# Patient Record
Sex: FEMALE | Race: ASIAN | NOT HISPANIC OR LATINO | Employment: OTHER | URBAN - METROPOLITAN AREA
[De-identification: names, ages, dates, MRNs, and addresses within clinical notes are randomized per-mention and may not be internally consistent; named-entity substitution may affect disease eponyms.]

---

## 2025-05-11 ENCOUNTER — HOSPITAL ENCOUNTER (EMERGENCY)
Facility: HOSPITAL | Age: 70
Discharge: HOME | End: 2025-05-11
Attending: STUDENT IN AN ORGANIZED HEALTH CARE EDUCATION/TRAINING PROGRAM
Payer: MEDICARE

## 2025-05-11 ENCOUNTER — APPOINTMENT (OUTPATIENT)
Dept: CARDIOLOGY | Facility: HOSPITAL | Age: 70
End: 2025-05-11
Payer: MEDICARE

## 2025-05-11 ENCOUNTER — APPOINTMENT (OUTPATIENT)
Dept: RADIOLOGY | Facility: HOSPITAL | Age: 70
End: 2025-05-11
Payer: MEDICARE

## 2025-05-11 VITALS
HEIGHT: 61 IN | WEIGHT: 125 LBS | TEMPERATURE: 98.6 F | HEART RATE: 66 BPM | BODY MASS INDEX: 23.6 KG/M2 | OXYGEN SATURATION: 99 % | RESPIRATION RATE: 18 BRPM | DIASTOLIC BLOOD PRESSURE: 79 MMHG | SYSTOLIC BLOOD PRESSURE: 146 MMHG

## 2025-05-11 DIAGNOSIS — R07.9 CHEST PAIN, UNSPECIFIED TYPE: Primary | ICD-10-CM

## 2025-05-11 LAB
ALBUMIN SERPL BCP-MCNC: 4.4 G/DL (ref 3.4–5)
ALP SERPL-CCNC: 49 U/L (ref 33–136)
ALT SERPL W P-5'-P-CCNC: 38 U/L (ref 7–45)
ANION GAP SERPL CALC-SCNC: 11 MMOL/L (ref 10–20)
AST SERPL W P-5'-P-CCNC: 27 U/L (ref 9–39)
BASOPHILS # BLD AUTO: 0.06 X10*3/UL (ref 0–0.1)
BASOPHILS NFR BLD AUTO: 0.8 %
BILIRUB SERPL-MCNC: 0.4 MG/DL (ref 0–1.2)
BNP SERPL-MCNC: 12 PG/ML (ref 0–99)
BUN SERPL-MCNC: 14 MG/DL (ref 6–23)
CALCIUM SERPL-MCNC: 9.2 MG/DL (ref 8.6–10.3)
CARDIAC TROPONIN I PNL SERPL HS: <3 NG/L (ref 0–13)
CARDIAC TROPONIN I PNL SERPL HS: <3 NG/L (ref 0–13)
CHLORIDE SERPL-SCNC: 102 MMOL/L (ref 98–107)
CO2 SERPL-SCNC: 30 MMOL/L (ref 21–32)
CREAT SERPL-MCNC: 0.79 MG/DL (ref 0.5–1.05)
EGFRCR SERPLBLD CKD-EPI 2021: 81 ML/MIN/1.73M*2
EOSINOPHIL # BLD AUTO: 0.17 X10*3/UL (ref 0–0.7)
EOSINOPHIL NFR BLD AUTO: 2.3 %
ERYTHROCYTE [DISTWIDTH] IN BLOOD BY AUTOMATED COUNT: 12.9 % (ref 11.5–14.5)
GLUCOSE SERPL-MCNC: 94 MG/DL (ref 74–99)
HCT VFR BLD AUTO: 40.9 % (ref 36–46)
HGB BLD-MCNC: 13.6 G/DL (ref 12–16)
IMM GRANULOCYTES # BLD AUTO: 0.01 X10*3/UL (ref 0–0.7)
IMM GRANULOCYTES NFR BLD AUTO: 0.1 % (ref 0–0.9)
INR PPP: 1 (ref 0.9–1.1)
LYMPHOCYTES # BLD AUTO: 3.22 X10*3/UL (ref 1.2–4.8)
LYMPHOCYTES NFR BLD AUTO: 43 %
MCH RBC QN AUTO: 32.5 PG (ref 26–34)
MCHC RBC AUTO-ENTMCNC: 33.3 G/DL (ref 32–36)
MCV RBC AUTO: 98 FL (ref 80–100)
MONOCYTES # BLD AUTO: 0.64 X10*3/UL (ref 0.1–1)
MONOCYTES NFR BLD AUTO: 8.6 %
NEUTROPHILS # BLD AUTO: 3.38 X10*3/UL (ref 1.2–7.7)
NEUTROPHILS NFR BLD AUTO: 45.2 %
NRBC BLD-RTO: 0 /100 WBCS (ref 0–0)
PLATELET # BLD AUTO: 268 X10*3/UL (ref 150–450)
POTASSIUM SERPL-SCNC: 3.6 MMOL/L (ref 3.5–5.3)
PROT SERPL-MCNC: 7.3 G/DL (ref 6.4–8.2)
PROTHROMBIN TIME: 11 SECONDS (ref 9.8–12.4)
RBC # BLD AUTO: 4.19 X10*6/UL (ref 4–5.2)
SODIUM SERPL-SCNC: 139 MMOL/L (ref 136–145)
WBC # BLD AUTO: 7.5 X10*3/UL (ref 4.4–11.3)

## 2025-05-11 PROCEDURE — 80053 COMPREHEN METABOLIC PANEL: CPT | Performed by: STUDENT IN AN ORGANIZED HEALTH CARE EDUCATION/TRAINING PROGRAM

## 2025-05-11 PROCEDURE — 85610 PROTHROMBIN TIME: CPT | Performed by: EMERGENCY MEDICINE

## 2025-05-11 PROCEDURE — 83880 ASSAY OF NATRIURETIC PEPTIDE: CPT | Performed by: EMERGENCY MEDICINE

## 2025-05-11 PROCEDURE — 84484 ASSAY OF TROPONIN QUANT: CPT | Performed by: STUDENT IN AN ORGANIZED HEALTH CARE EDUCATION/TRAINING PROGRAM

## 2025-05-11 PROCEDURE — 71045 X-RAY EXAM CHEST 1 VIEW: CPT | Mod: FOREIGN READ | Performed by: RADIOLOGY

## 2025-05-11 PROCEDURE — 36415 COLL VENOUS BLD VENIPUNCTURE: CPT | Performed by: EMERGENCY MEDICINE

## 2025-05-11 PROCEDURE — 85025 COMPLETE CBC W/AUTO DIFF WBC: CPT | Performed by: EMERGENCY MEDICINE

## 2025-05-11 PROCEDURE — 99285 EMERGENCY DEPT VISIT HI MDM: CPT | Performed by: STUDENT IN AN ORGANIZED HEALTH CARE EDUCATION/TRAINING PROGRAM

## 2025-05-11 PROCEDURE — 85025 COMPLETE CBC W/AUTO DIFF WBC: CPT | Performed by: STUDENT IN AN ORGANIZED HEALTH CARE EDUCATION/TRAINING PROGRAM

## 2025-05-11 PROCEDURE — 80053 COMPREHEN METABOLIC PANEL: CPT | Performed by: EMERGENCY MEDICINE

## 2025-05-11 PROCEDURE — 71045 X-RAY EXAM CHEST 1 VIEW: CPT

## 2025-05-11 PROCEDURE — 93005 ELECTROCARDIOGRAM TRACING: CPT

## 2025-05-11 PROCEDURE — 83880 ASSAY OF NATRIURETIC PEPTIDE: CPT | Performed by: STUDENT IN AN ORGANIZED HEALTH CARE EDUCATION/TRAINING PROGRAM

## 2025-05-11 PROCEDURE — 84484 ASSAY OF TROPONIN QUANT: CPT | Performed by: EMERGENCY MEDICINE

## 2025-05-11 PROCEDURE — 85610 PROTHROMBIN TIME: CPT | Performed by: STUDENT IN AN ORGANIZED HEALTH CARE EDUCATION/TRAINING PROGRAM

## 2025-05-11 ASSESSMENT — HEART SCORE
ECG: NORMAL
TROPONIN: LESS THAN OR EQUAL TO NORMAL LIMIT
RISK FACTORS: 1-2 RISK FACTORS
HISTORY: SLIGHTLY SUSPICIOUS
AGE: 65+
HEART SCORE: 3

## 2025-05-11 ASSESSMENT — PAIN SCALES - GENERAL
PAINLEVEL_OUTOF10: 3
PAINLEVEL_OUTOF10: 0 - NO PAIN

## 2025-05-11 ASSESSMENT — PAIN - FUNCTIONAL ASSESSMENT: PAIN_FUNCTIONAL_ASSESSMENT: 0-10

## 2025-05-11 ASSESSMENT — LIFESTYLE VARIABLES
TOTAL SCORE: 0
HAVE PEOPLE ANNOYED YOU BY CRITICIZING YOUR DRINKING: NO
EVER HAD A DRINK FIRST THING IN THE MORNING TO STEADY YOUR NERVES TO GET RID OF A HANGOVER: NO
HAVE YOU EVER FELT YOU SHOULD CUT DOWN ON YOUR DRINKING: NO
EVER FELT BAD OR GUILTY ABOUT YOUR DRINKING: NO

## 2025-05-11 ASSESSMENT — COLUMBIA-SUICIDE SEVERITY RATING SCALE - C-SSRS
2. HAVE YOU ACTUALLY HAD ANY THOUGHTS OF KILLING YOURSELF?: NO
1. IN THE PAST MONTH, HAVE YOU WISHED YOU WERE DEAD OR WISHED YOU COULD GO TO SLEEP AND NOT WAKE UP?: NO
6. HAVE YOU EVER DONE ANYTHING, STARTED TO DO ANYTHING, OR PREPARED TO DO ANYTHING TO END YOUR LIFE?: NO

## 2025-05-11 NOTE — ED PROVIDER NOTES
"Emergency Department Provider Note             History of Present Illness   CC: Chest Pain (Midsternal chest pressure intermittently x1 month while exercising; pt states she was sitting in [employer] today when the chest pressure started. Pt states it has been a dull ache that has been \"pretty much with me all day\". Pt reports tingling sensation in her R arm 2-3 days ago that has since went away. )    History provided by: Patient  Limitations to History: None    HPI:  Jenni Garrett is a 70 y.o. female with history of anxiety, osteopenia, JOHNSON, who presents for midsternal chest pressure/pain that has been occurring intermittently for about a month.  She does exercise frequently and states that her pain may be attributed to muscle strain, particularly related to the fact that she has been pushing her lawnmower over the last month with the warmer weather.  She has no significant cardiac history no significant family history of any heart attacks.  Patient's father did have congestive heart failure and old age.  Patient states that she was sitting in [place] today she felt a sudden pressure in her chest became worried so she presented to the ER.  She states that this chest pain has been intermittently related to exertion but not always.  She thinks it may be mostly related to certain motions rather than exertion alone which exacerbates her pain.  Otherwise, no shortness of breath, no lower extremity swelling or pain.  No history of DVT/PE.  No recent significant prolonged sitting or traveling.  Otherwise feeling well and having no other new symptoms.  She does not take any medications.    Physical Exam   Triage vitals:  T 37 °C (98.6 °F)  HR 69  /79  RR 18  O2 99 % None (Room air)    Physical Exam  Constitutional:       General: She is not in acute distress.     Appearance: Normal appearance. She is not ill-appearing or toxic-appearing.   HENT:      Head: Normocephalic and atraumatic.   Eyes:      Extraocular " Movements: Extraocular movements intact.   Cardiovascular:      Rate and Rhythm: Normal rate and regular rhythm.      Heart sounds: Normal heart sounds. Heart sounds not distant. No murmur heard.  Pulmonary:      Effort: Pulmonary effort is normal. No respiratory distress.      Breath sounds: Normal breath sounds. No decreased breath sounds, wheezing, rhonchi or rales.   Chest:      Chest wall: No tenderness.      Comments: No reproducible TTP anywhere along the anterior chest  Musculoskeletal:      Right lower leg: No swelling or tenderness. No edema.      Left lower leg: No swelling or tenderness. No edema.   Skin:     General: Skin is warm and dry.   Neurological:      General: No focal deficit present.      Mental Status: She is alert. Mental status is at baseline.   Psychiatric:         Mood and Affect: Mood normal.         Behavior: Behavior normal.         Thought Content: Thought content normal.         ED Course & Medical Decision Making   Medical Decision Makin y.o. female with history of anxiety, osteopenia, JOHNSON, who presents for midsternal chest pressure/pain that has been occurring intermittently for about a month.  Had an acute episode of chest pain at rest while at discharge today.  VSS on presentation.  Workup including CBC, high-sensitivity troponin, BNP, CMP grossly WNL.  Troponin <3 x2.  Very low suspicion for PE, no significant history no symptoms suggestive, patient not tachycardic patient not hypoxic.  CXR obtained and revealed no acute abnormalities. EKG reviewed as below, otherwise normal EKG.  No significant cardiac history.  Very minimal risk factors and patient's history other than age.  Patient is a very active individual and exercises frequently.  Exam as above is entirely benign.  Patient heart score of 3, low risk.  Patient was deemed stable for home discharge.  Patient was given referral for cardiology to follow-up as well.  Return/ER precautions discussed at length the  patient.  Patient stands and agrees to plan of care.     External Records Reviewed: None    Differential diagnoses considered include but are not limited to: Costochondritis, muscle sprain    Social Determinants Limiting Care: None identified    EKG: NSR, HR 63, MS interval 126, QTc, no ST segment or T wave abnormalities/changes.  Otherwise normal EKG    Results: Relevant laboratory and radiographic results were reviewed and independently interpreted by myself.  As necessary, they are commented on in the ED Course.  Or in above [facility]    Chronic Medical Conditions Significantly Affecting Care: As documented above in [facility]    Patient was discussed with the following consultants/services: None     Care Considerations: As documented above in [facility]    ED Course:  Diagnoses as of 05/12/25 0019   Chest pain, unspecified type     Disposition   Home, stable.    Procedures   None    Patient seen and discussed with ED attending physician.    Sam Mancera D.O.   Family Medicine PGY-2, [facility]  05/12/25           Sam Mancera, DO  Resident  05/11/25 1924       Sven Wilson MD  05/12/25 0019

## 2025-05-11 NOTE — DISCHARGE INSTRUCTIONS
Follow-up in the emergency room if you have any new or worsening chest pain, shortness of breath, otherwise signs and symptoms of heart attack.    Otherwise, you can follow-up with cardiology for further workup of your symptoms.  A referral for this has been placed.

## 2025-05-12 LAB
ATRIAL RATE: 63 BPM
ATRIAL RATE: 69 BPM
P AXIS: 61 DEGREES
P AXIS: 64 DEGREES
P OFFSET: 206 MS
P OFFSET: 206 MS
P ONSET: 161 MS
P ONSET: 162 MS
PR INTERVAL: 124 MS
PR INTERVAL: 126 MS
Q ONSET: 224 MS
Q ONSET: 224 MS
QRS COUNT: 11 BEATS
QRS COUNT: 11 BEATS
QRS DURATION: 72 MS
QRS DURATION: 74 MS
QT INTERVAL: 402 MS
QT INTERVAL: 404 MS
QTC CALCULATION(BAZETT): 413 MS
QTC CALCULATION(BAZETT): 430 MS
QTC FREDERICIA: 410 MS
QTC FREDERICIA: 421 MS
R AXIS: 44 DEGREES
R AXIS: 51 DEGREES
T AXIS: 51 DEGREES
T AXIS: 56 DEGREES
T OFFSET: 425 MS
T OFFSET: 426 MS
VENTRICULAR RATE: 63 BPM
VENTRICULAR RATE: 69 BPM
